# Patient Record
Sex: FEMALE | Race: OTHER | URBAN - METROPOLITAN AREA
[De-identification: names, ages, dates, MRNs, and addresses within clinical notes are randomized per-mention and may not be internally consistent; named-entity substitution may affect disease eponyms.]

---

## 2020-06-19 ENCOUNTER — EMERGENCY (EMERGENCY)
Facility: HOSPITAL | Age: 20
LOS: 0 days | Discharge: HOME | End: 2020-06-19
Payer: COMMERCIAL

## 2020-06-19 VITALS
DIASTOLIC BLOOD PRESSURE: 54 MMHG | WEIGHT: 160.06 LBS | HEART RATE: 109 BPM | RESPIRATION RATE: 17 BRPM | TEMPERATURE: 101 F | HEIGHT: 61 IN | SYSTOLIC BLOOD PRESSURE: 90 MMHG | OXYGEN SATURATION: 99 %

## 2020-06-19 DIAGNOSIS — L05.01 PILONIDAL CYST WITH ABSCESS: ICD-10-CM

## 2020-06-19 LAB — GLUCOSE BLDC GLUCOMTR-MCNC: 110 MG/DL — HIGH (ref 70–99)

## 2020-06-19 PROCEDURE — 10080 I&D PILONIDAL CYST SIMPLE: CPT

## 2020-06-19 PROCEDURE — 99284 EMERGENCY DEPT VISIT MOD MDM: CPT | Mod: 25

## 2020-06-19 RX ORDER — OXYCODONE AND ACETAMINOPHEN 5; 325 MG/1; MG/1
1 TABLET ORAL ONCE
Refills: 0 | Status: DISCONTINUED | OUTPATIENT
Start: 2020-06-19 | End: 2020-06-19

## 2020-06-19 RX ORDER — ACETAMINOPHEN 500 MG
650 TABLET ORAL ONCE
Refills: 0 | Status: COMPLETED | OUTPATIENT
Start: 2020-06-19 | End: 2020-06-19

## 2020-06-19 RX ORDER — BUPIVACAINE HCL/PF 7.5 MG/ML
20 VIAL (ML) INJECTION ONCE
Refills: 0 | Status: COMPLETED | OUTPATIENT
Start: 2020-06-19 | End: 2020-06-19

## 2020-06-19 RX ORDER — IBUPROFEN 200 MG
1 TABLET ORAL
Qty: 28 | Refills: 0
Start: 2020-06-19 | End: 2020-06-25

## 2020-06-19 RX ORDER — AZTREONAM 2 G
1 VIAL (EA) INJECTION
Qty: 20 | Refills: 0
Start: 2020-06-19 | End: 2020-06-28

## 2020-06-19 RX ADMIN — Medication 20 MILLILITER(S): at 15:41

## 2020-06-19 RX ADMIN — Medication 1 TABLET(S): at 16:30

## 2020-06-19 RX ADMIN — OXYCODONE AND ACETAMINOPHEN 1 TABLET(S): 5; 325 TABLET ORAL at 15:10

## 2020-06-19 NOTE — ED PROVIDER NOTE - NS_EDPROVIDERDISPOUSERTYPE_ED_A_ED
Scribe Attestation (For Scribes USE Only).../I have personally evaluated and examined the patient. The Attending was available to me as a supervising provider if needed.

## 2020-06-19 NOTE — ED PROVIDER NOTE - PATIENT PORTAL LINK FT
You can access the FollowMyHealth Patient Portal offered by [facility] by registering at the following website: [email or web address]. By joining [facility]’s FollowMyHealth portal, you will also be able to view your health information using other applications (apps) compatible with our system.

## 2020-06-19 NOTE — ED PROVIDER NOTE - OBJECTIVE STATEMENT
21 y/o F with no pertinent pmhx presents c/o abscess on mid lower buttock area for x2-3 days. Pt reports assc. subjective fevers. Pt denies N/V/D.

## 2020-06-19 NOTE — ED PROVIDER NOTE - SKIN, MLM
Skin normal color for race, warm, dry and intact. No evidence of rash. Skin normal color for race, warm, dry and intact. No evidence of rash. + 6x4 CM area of fluctuance, erythema, and tenderness.

## 2020-06-19 NOTE — ED ADULT TRIAGE NOTE - CHIEF COMPLAINT QUOTE
Pt c/o abscess to coccyx for 1 week denies any drainage. Pt states she took Tylenol at 2p.m. temperature at home was 102

## 2020-06-19 NOTE — ED PROVIDER NOTE - CLINICAL SUMMARY MEDICAL DECISION MAKING FREE TEXT BOX
bactrim ds, motrin and f/u w surgeon in 1 week  Discussed results and outcome of testing with the patient.  Patient advised to please follow up with their primary care doctor within the next 24-48 hours and return to the Emergency Department for worsening symptoms or any other concerns.  Patient advised that their doctor may call  to follow up on the specific results of the tests performed today in the emergency department.

## 2020-06-21 ENCOUNTER — EMERGENCY (EMERGENCY)
Facility: HOSPITAL | Age: 20
LOS: 0 days | Discharge: HOME | End: 2020-06-21
Payer: COMMERCIAL

## 2020-06-21 VITALS
RESPIRATION RATE: 18 BRPM | WEIGHT: 154.98 LBS | TEMPERATURE: 99 F | HEART RATE: 89 BPM | SYSTOLIC BLOOD PRESSURE: 111 MMHG | HEIGHT: 61 IN | DIASTOLIC BLOOD PRESSURE: 72 MMHG

## 2020-06-21 VITALS — DIASTOLIC BLOOD PRESSURE: 68 MMHG | SYSTOLIC BLOOD PRESSURE: 116 MMHG | OXYGEN SATURATION: 97 %

## 2020-06-21 DIAGNOSIS — L05.01 PILONIDAL CYST WITH ABSCESS: ICD-10-CM

## 2020-06-21 LAB
METHOD TYPE: SIGNIFICANT CHANGE UP

## 2020-06-21 PROCEDURE — L9995: CPT

## 2020-06-21 NOTE — ED PROVIDER NOTE - SKIN, MLM
Skin normal color for race, warm, upper buttock TTP, with small incision, packing in place with no erythema or foul discharge

## 2020-06-21 NOTE — ED PROVIDER NOTE - OBJECTIVE STATEMENT
21 y/o F with pilonidal cyst was seen here 2 days ago, she was told to return for wound check today, no associated symptoms, denies fever and other concerns.

## 2020-06-21 NOTE — ED PROVIDER NOTE - PATIENT PORTAL LINK FT
You can access the FollowMyHealth Patient Portal offered by [facility] by registering at the following website: [email or web address]. By joining [employer] [facility]’s FollowMyHealth portal, you will also be able to view your health information using other applications (apps) compatible with our system.

## 2020-06-21 NOTE — ED PROVIDER NOTE - CLINICAL SUMMARY MEDICAL DECISION MAKING FREE TEXT BOX
19 y/o F with pilonidal abscess was seen today for wound check, NAD, packing removed with no complications pt will continue prescribed abx and f/u with general surgery.

## 2020-06-22 PROBLEM — Z78.9 OTHER SPECIFIED HEALTH STATUS: Chronic | Status: ACTIVE | Noted: 2020-06-19

## 2020-06-22 LAB
-  AMIKACIN: SIGNIFICANT CHANGE UP
-  AMOXICILLIN/CLAVULANIC ACID: SIGNIFICANT CHANGE UP
-  AMPICILLIN/SULBACTAM: SIGNIFICANT CHANGE UP
-  AMPICILLIN: SIGNIFICANT CHANGE UP
-  AZTREONAM: SIGNIFICANT CHANGE UP
-  CEFAZOLIN: SIGNIFICANT CHANGE UP
-  CEFEPIME: SIGNIFICANT CHANGE UP
-  CEFOXITIN: SIGNIFICANT CHANGE UP
-  CEFTRIAXONE: SIGNIFICANT CHANGE UP
-  CIPROFLOXACIN: SIGNIFICANT CHANGE UP
-  ERTAPENEM: SIGNIFICANT CHANGE UP
-  GENTAMICIN: SIGNIFICANT CHANGE UP
-  IMIPENEM: SIGNIFICANT CHANGE UP
-  LEVOFLOXACIN: SIGNIFICANT CHANGE UP
-  MEROPENEM: SIGNIFICANT CHANGE UP
-  PIPERACILLIN/TAZOBACTAM: SIGNIFICANT CHANGE UP
-  TOBRAMYCIN: SIGNIFICANT CHANGE UP
-  TRIMETHOPRIM/SULFAMETHOXAZOLE: SIGNIFICANT CHANGE UP
CULTURE RESULTS: SIGNIFICANT CHANGE UP
METHOD TYPE: SIGNIFICANT CHANGE UP
ORGANISM # SPEC MICROSCOPIC CNT: SIGNIFICANT CHANGE UP
SPECIMEN SOURCE: SIGNIFICANT CHANGE UP

## 2020-11-03 NOTE — ED ADULT TRIAGE NOTE - NSWEIGHTCALCTOOLDRUG_GEN_A_CORE
Chief Complaint   Patient presents with   • Routine Prenatal Visit     cc: ob chk, bp 148/92 pt stated she is supposed to take medication in a couple hours forgot to take it last night pt on Labetalol 200mg BID, still taking ASA, yesterday she woke up with extreme pelvic pain went to urgent care in  [place] they tested her for bv awaiting results and uti was negative, pain caused tasneem kwon ctx, no bleeding       HPI: 23 y.o.  at 21w3d presents for prenatal care - forgot to take labetalol - enc to drink more water - reports cannot make appt with Neurology - no childcare     Vitals:    20 1320   BP: 148/92   Weight: 103 kg (226 lb)       Review of systems:     Gen: negative  CV:     negative  GI: negative  :   negative and good fetal movement noted   MS:    negative  Neuro: negative and denies headaches and visual changes   Pul: negative      A/P  1. Intrauterine pregnancy at 21w3d   2. Pregnancy Risk:  HIGH RISK    Blood pressure is out of parameters.      Diagnoses and all orders for this visit:    1. Prenatal care, subsequent pregnancy in second trimester (Primary)    2. Screening for blood or protein in urine  -     POC Urinalysis Dipstick    3. Chronic hypertension affecting pregnancy    4. Obesity in pregnancy, antepartum    5. Susceptible to varicella (non-immune), currently pregnant    6. Request for sterilization    7. Previous  delivery, antepartum    8. Hx of preeclampsia, prior pregnancy, currently pregnant    9. Carrier of fragile X syndrome        Pre-eclampsia symptoms were discussed and warnings were given   labor was discussed.  Warnings were provided.  Nutrition and weight gain were addressed.      -----------------------  PLAN:   Return in about 4 weeks (around 2020), or ob check.  CHTN- cont ASA and Labetalol   Dehydration - enc increased H20   Obesity - 1 lb weight gain   PTL/PreE warnings       Anali Del Angel MD  11/3/2020 13:31 EST    
 used

## 2022-01-31 NOTE — ED PROVIDER NOTE - NORMAL, MLM
Please advise on hospital follow up; we were originally going to offer 2/4 at 1:30pm and appointment is no longer available.  Dx: pancreatitis and to discuss possible EUS    presley all pertinent systems normal
